# Patient Record
Sex: MALE | Race: WHITE
[De-identification: names, ages, dates, MRNs, and addresses within clinical notes are randomized per-mention and may not be internally consistent; named-entity substitution may affect disease eponyms.]

---

## 2018-03-26 ENCOUNTER — HOSPITAL ENCOUNTER (EMERGENCY)
Dept: HOSPITAL 17 - NEPK | Age: 58
Discharge: HOME | End: 2018-03-26
Payer: COMMERCIAL

## 2018-03-26 VITALS
OXYGEN SATURATION: 99 % | TEMPERATURE: 98.8 F | SYSTOLIC BLOOD PRESSURE: 144 MMHG | DIASTOLIC BLOOD PRESSURE: 88 MMHG | RESPIRATION RATE: 19 BRPM | HEART RATE: 88 BPM

## 2018-03-26 VITALS — WEIGHT: 275.58 LBS | HEIGHT: 73 IN | BODY MASS INDEX: 36.52 KG/M2

## 2018-03-26 DIAGNOSIS — I10: ICD-10-CM

## 2018-03-26 DIAGNOSIS — B02.9: Primary | ICD-10-CM

## 2018-03-26 PROCEDURE — 99283 EMERGENCY DEPT VISIT LOW MDM: CPT

## 2018-03-26 NOTE — PD
HPI


Chief Complaint:  Facial Pain or Swelling


Time Seen by Provider:  09:47


Travel History


International Travel<30 days:  No


Contact w/Intl Traveler<30days:  No


Traveled to known affect area:  No





History of Present Illness


HPI


58-year-old male with history of hypertension presents to the emergency room 

for evaluation of left-sided facial pain, redness, swelling for the past week.  

Patient states it started off as a small pimple-like area around his left 

temple region and has been spreading down his face and into his forehead since 

then.  He has associated swollen glands that are painful.  He denies prodromal 

symptoms, fever, chills, nausea, vomiting, blurred vision, eye pain, or 

drainage.  States pain is burning in nature and only occurs when he touches the 

skin.  No significant aching.  No itching.  He has history of MRSA.  He did not 

get a shingles vaccination this year.





PFSH


Past Medical History


Cardiovascular Problems:  Yes (HTN)


Diminished Hearing:  No


Hypertension:  Yes


Tetanus Vaccination:  > 5 Years





Past Surgical History


Surgical History:  No Previous Surgery





Social History


Alcohol Use:  No


Tobacco Use:  No


Substance Use:  No





Allergies-Medications


(Allergen,Severity, Reaction):  


Coded Allergies:  


     No Known Allergies (Unverified  Adverse Reaction, Unknown, 3/26/18)


Reported Meds & Prescriptions





Reported Meds & Active Scripts


Active


Bactrim DS (Sulfamethoxazole-Trimethoprim) 800-160 Mg Tab 1 Tab PO BID


Acyclovir 800 Mg Tab 800 Mg PO 5 TIMES A DAY 7 Days


Reported


Lisinopril 5 Mg Tab 5 Mg PO DAILY








Review of Systems


Except as stated in HPI:  all other systems reviewed are Neg





Physical Exam


Narrative


GENERAL: Well-nourished, well-developed male in no acute distress.  Afebrile.  

Ambulatory.


SKIN: Focused skin assessment warm/dry.  Several erythematous maculopapular 

lesions and vesicles on an indurated base throughout the left side of the face 

that is tender to palpation.  There is associated tender preauricular 

lymphadenopathy. 


HEAD: Normocephalic.


EYES: PERRL, EOMI, no discharge or injection. No scleral icterus.  Fluorescein 

staining reveals no corneal abrasion, ulceration, or foreign body.


NECK: Supple, trachea midline. No JVD or lymphadenopathy.


CARDIOVASCULAR: Regular rate and rhythm without murmurs, gallops, or rubs. 


RESPIRATORY: Breath sounds equal bilaterally. No accessory muscle use.


NEUROLOGICAL: Awake and alert. Cranial nerves II through XII intact.  Motor and 

sensory grossly within normal limits. Five out of 5 muscle strength in all 

muscle groups.  Normal speech.





Data


Data


Last Documented VS





Vital Signs








  Date Time  Temp Pulse Resp B/P (MAP) Pulse Ox O2 Delivery O2 Flow Rate FiO2


 


3/26/18 08:25 98.8 88 19 144/88 (106) 99   








Orders





 Orders


Ed Discharge Order (3/26/18 10:11)








[facility]


Medical Decision Making


Medical Screen Exam Complete:  Yes


Emergency Medical Condition:  Yes


Medical Record Reviewed:  Yes


Differential Diagnosis


Abscess, cellulitis, shingles, contact dermatitis


Narrative Course


58-year-old male with history of hypertension presents to the emergency room 

for evaluation of left-sided facial pain and rash that started 1 week ago.  It 

has been spreading since then.  Physical exam reveals several erythematous 

maculopapular lesions and vesicles on an indurated base throughout the left 

side of the face that is tender to palpation.  There is associated tender 

preauricular lymphadenopathy.  Fluorescein staining reveals no eye involvement.

  Consistent with shingles but given history of MRSA will be treated 

empirically for cellulitis in case of secondary infection.  Patient discharged 

with prescriptions for Bactrim and acyclovir.  Told to follow-up with a primary 

care physician or return for worsening symptoms.  He understands and agrees to 

plan.





Diagnosis





 Primary Impression:  


 Shingles rash


 Qualified Codes:  B02.9 - Zoster without complications


Referrals:  


Primary Care Physician


Departure Forms:  Tests/Procedures, Work Release   Enter return to work date:  

Mar 27, 2018





***Additional Instructions:  


Rest and drink plenty of fluids.


Take Bactrim as directed, until gone.


Take acyclovir as directed, until gone.


Follow up with a primary care physician.


Return to emergency room for worsening symptoms, as discussed.


***Med/Other Pt SpecificInfo:  Prescription(s) given


Scripts


Sulfamethoxazole-Trimethoprim (Bactrim DS) 800-160 Mg Tab


1 TAB PO BID for Infection, #20 TAB 0 Refills


   Prov: Shu Madrid MD         3/26/18 


Acyclovir (Acyclovir) 800 Mg Tab


800 MG PO 5 TIMES A DAY for Mgmt Viral Infection for 7 Days, TAB 0 Refills


   Prov: Shu Madrid MD         3/26/18


Disposition:  01 DISCHARGE HOME


Condition:  Stable











Selene Melendez Mar 26, 2018 10:10